# Patient Record
Sex: MALE | Race: WHITE | NOT HISPANIC OR LATINO | Employment: FULL TIME | URBAN - METROPOLITAN AREA
[De-identification: names, ages, dates, MRNs, and addresses within clinical notes are randomized per-mention and may not be internally consistent; named-entity substitution may affect disease eponyms.]

---

## 2023-11-20 ENCOUNTER — TELEMEDICINE CLINICAL SUPPORT (OUTPATIENT)
Dept: PREADMISSION TESTING | Facility: HOSPITAL | Age: 33
End: 2023-11-20
Payer: COMMERCIAL

## 2023-11-20 VITALS — HEIGHT: 70 IN | BODY MASS INDEX: 27.92 KG/M2 | WEIGHT: 195 LBS

## 2023-11-20 RX ORDER — OMEPRAZOLE 40 MG/1
40 CAPSULE, DELAYED RELEASE ORAL
COMMUNITY
Start: 2023-08-01

## 2023-11-20 NOTE — PREPROCEDURE INSTRUCTIONS
Current Medications   Medication Instructions    omeprazole (PriLOSEC) 40 mg DR capsule Continue until night before surgery                       NPO Instructions:    Do not eat any food after midnight the night before your surgery/procedure.    Additional Instructions:     Seven/Six Days before Surgery:  Review your medication instructions, stop indicated medications  Five Days before Surgery:  Review your medication instructions, stop indicated medications  Three Days before Surgery:  Review your medication instructions, stop indicated medications  The Day before Surgery:  Review your medication instructions, stop indicated medications  You will be contacted regarding the time of your arrival to facility and surgery time  Do not eat any food after Midnight  Day of Surgery:  Review your medication instructions, take indicated medications  Wear  comfortable loose fitting clothing  Do not use moisturizers, creams, lotions or perfume  All jewelry and valuables should be left at home    Reviewed pre-op instructions with patient, states understanding and denies further questions at this time.    If you have not received a call regarding your arrival time for surgery by 2pm on the day before surgery, you can call [phone].    Take Care!

## 2023-11-22 ENCOUNTER — APPOINTMENT (OUTPATIENT)
Dept: PREADMISSION TESTING | Facility: HOSPITAL | Age: 33
End: 2023-11-22
Payer: COMMERCIAL

## 2023-12-01 ENCOUNTER — ANESTHESIA EVENT (OUTPATIENT)
Dept: OPERATING ROOM | Facility: HOSPITAL | Age: 33
End: 2023-12-01
Payer: COMMERCIAL

## 2023-12-05 ENCOUNTER — APPOINTMENT (OUTPATIENT)
Dept: RADIOLOGY | Facility: HOSPITAL | Age: 33
End: 2023-12-05
Payer: COMMERCIAL

## 2023-12-05 ENCOUNTER — HOSPITAL ENCOUNTER (OUTPATIENT)
Facility: HOSPITAL | Age: 33
Setting detail: OUTPATIENT SURGERY
Discharge: HOME | End: 2023-12-05
Attending: ORTHOPAEDIC SURGERY | Admitting: ORTHOPAEDIC SURGERY
Payer: COMMERCIAL

## 2023-12-05 ENCOUNTER — ANESTHESIA (OUTPATIENT)
Dept: OPERATING ROOM | Facility: HOSPITAL | Age: 33
End: 2023-12-05
Payer: COMMERCIAL

## 2023-12-05 VITALS
RESPIRATION RATE: 16 BRPM | BODY MASS INDEX: 27.74 KG/M2 | HEIGHT: 70 IN | SYSTOLIC BLOOD PRESSURE: 126 MMHG | TEMPERATURE: 97.2 F | WEIGHT: 193.78 LBS | HEART RATE: 71 BPM | OXYGEN SATURATION: 95 % | DIASTOLIC BLOOD PRESSURE: 67 MMHG

## 2023-12-05 DIAGNOSIS — M25.852 FEMOROACETABULAR IMPINGEMENT OF LEFT HIP: Primary | ICD-10-CM

## 2023-12-05 PROBLEM — K21.9 GASTROESOPHAGEAL REFLUX DISEASE: Status: ACTIVE | Noted: 2023-12-05

## 2023-12-05 PROCEDURE — 3600000004 HC OR TIME - INITIAL BASE CHARGE - PROCEDURE LEVEL FOUR: Performed by: ORTHOPAEDIC SURGERY

## 2023-12-05 PROCEDURE — A29916 PR ARTHROSCOPY HIP W/LABRAL REPAIR: Performed by: NURSE ANESTHETIST, CERTIFIED REGISTERED

## 2023-12-05 PROCEDURE — 29914 HIP ARTHRO W/FEMOROPLASTY: CPT | Performed by: PHYSICIAN ASSISTANT

## 2023-12-05 PROCEDURE — 7100000009 HC PHASE TWO TIME - INITIAL BASE CHARGE: Performed by: ORTHOPAEDIC SURGERY

## 2023-12-05 PROCEDURE — 2500000004 HC RX 250 GENERAL PHARMACY W/ HCPCS (ALT 636 FOR OP/ED): Performed by: PHYSICIAN ASSISTANT

## 2023-12-05 PROCEDURE — 2500000004 HC RX 250 GENERAL PHARMACY W/ HCPCS (ALT 636 FOR OP/ED): Performed by: ORTHOPAEDIC SURGERY

## 2023-12-05 PROCEDURE — 29916 HIP ARTHRO W/LABRAL REPAIR: CPT | Performed by: ORTHOPAEDIC SURGERY

## 2023-12-05 PROCEDURE — 29999 UNLISTED PX ARTHROSCOPY: CPT | Performed by: PHYSICIAN ASSISTANT

## 2023-12-05 PROCEDURE — 2500000005 HC RX 250 GENERAL PHARMACY W/O HCPCS: Performed by: ORTHOPAEDIC SURGERY

## 2023-12-05 PROCEDURE — C1713 ANCHOR/SCREW BN/BN,TIS/BN: HCPCS | Performed by: ORTHOPAEDIC SURGERY

## 2023-12-05 PROCEDURE — 76000 FLUOROSCOPY <1 HR PHYS/QHP: CPT

## 2023-12-05 PROCEDURE — 7100000002 HC RECOVERY ROOM TIME - EACH INCREMENTAL 1 MINUTE: Performed by: ORTHOPAEDIC SURGERY

## 2023-12-05 PROCEDURE — 2500000001 HC RX 250 WO HCPCS SELF ADMINISTERED DRUGS (ALT 637 FOR MEDICARE OP): Performed by: PHYSICIAN ASSISTANT

## 2023-12-05 PROCEDURE — 76000 FLUOROSCOPY <1 HR PHYS/QHP: CPT | Mod: 59

## 2023-12-05 PROCEDURE — 2500000005 HC RX 250 GENERAL PHARMACY W/O HCPCS: Performed by: ANESTHESIOLOGIST ASSISTANT

## 2023-12-05 PROCEDURE — 2500000004 HC RX 250 GENERAL PHARMACY W/ HCPCS (ALT 636 FOR OP/ED): Performed by: STUDENT IN AN ORGANIZED HEALTH CARE EDUCATION/TRAINING PROGRAM

## 2023-12-05 PROCEDURE — 29914 HIP ARTHRO W/FEMOROPLASTY: CPT | Performed by: ORTHOPAEDIC SURGERY

## 2023-12-05 PROCEDURE — 7100000010 HC PHASE TWO TIME - EACH INCREMENTAL 1 MINUTE: Performed by: ORTHOPAEDIC SURGERY

## 2023-12-05 PROCEDURE — A29916 PR ARTHROSCOPY HIP W/LABRAL REPAIR: Performed by: STUDENT IN AN ORGANIZED HEALTH CARE EDUCATION/TRAINING PROGRAM

## 2023-12-05 PROCEDURE — 29916 HIP ARTHRO W/LABRAL REPAIR: CPT | Performed by: PHYSICIAN ASSISTANT

## 2023-12-05 PROCEDURE — 64450 NJX AA&/STRD OTHER PN/BRANCH: CPT | Performed by: STUDENT IN AN ORGANIZED HEALTH CARE EDUCATION/TRAINING PROGRAM

## 2023-12-05 PROCEDURE — 3700000001 HC GENERAL ANESTHESIA TIME - INITIAL BASE CHARGE: Performed by: ORTHOPAEDIC SURGERY

## 2023-12-05 PROCEDURE — 29861 HIP ARTHRO W/FB REMOVAL: CPT | Performed by: PHYSICIAN ASSISTANT

## 2023-12-05 PROCEDURE — 29999 UNLISTED PX ARTHROSCOPY: CPT | Performed by: ORTHOPAEDIC SURGERY

## 2023-12-05 PROCEDURE — 2500000004 HC RX 250 GENERAL PHARMACY W/ HCPCS (ALT 636 FOR OP/ED): Performed by: NURSE ANESTHETIST, CERTIFIED REGISTERED

## 2023-12-05 PROCEDURE — 2500000001 HC RX 250 WO HCPCS SELF ADMINISTERED DRUGS (ALT 637 FOR MEDICARE OP): Performed by: STUDENT IN AN ORGANIZED HEALTH CARE EDUCATION/TRAINING PROGRAM

## 2023-12-05 PROCEDURE — 2720000007 HC OR 272 NO HCPCS: Performed by: ORTHOPAEDIC SURGERY

## 2023-12-05 PROCEDURE — 3700000002 HC GENERAL ANESTHESIA TIME - EACH INCREMENTAL 1 MINUTE: Performed by: ORTHOPAEDIC SURGERY

## 2023-12-05 PROCEDURE — 3600000009 HC OR TIME - EACH INCREMENTAL 1 MINUTE - PROCEDURE LEVEL FOUR: Performed by: ORTHOPAEDIC SURGERY

## 2023-12-05 PROCEDURE — 2780000003 HC OR 278 NO HCPCS: Performed by: ORTHOPAEDIC SURGERY

## 2023-12-05 PROCEDURE — 29861 HIP ARTHRO W/FB REMOVAL: CPT | Performed by: ORTHOPAEDIC SURGERY

## 2023-12-05 PROCEDURE — 2500000004 HC RX 250 GENERAL PHARMACY W/ HCPCS (ALT 636 FOR OP/ED): Performed by: ANESTHESIOLOGIST ASSISTANT

## 2023-12-05 PROCEDURE — 7100000001 HC RECOVERY ROOM TIME - INITIAL BASE CHARGE: Performed by: ORTHOPAEDIC SURGERY

## 2023-12-05 PROCEDURE — 96372 THER/PROPH/DIAG INJ SC/IM: CPT | Performed by: ORTHOPAEDIC SURGERY

## 2023-12-05 DEVICE — NANOTACK TT SUTURE ANCHOR, 1.4MM WITH 1.2MM XBRAID TT
Type: IMPLANTABLE DEVICE | Site: HIP | Status: FUNCTIONAL
Brand: NANOTACK

## 2023-12-05 RX ORDER — KETOROLAC TROMETHAMINE 30 MG/ML
INJECTION, SOLUTION INTRAMUSCULAR; INTRAVENOUS AS NEEDED
Status: DISCONTINUED | OUTPATIENT
Start: 2023-12-05 | End: 2023-12-05

## 2023-12-05 RX ORDER — SODIUM CHLORIDE, SODIUM LACTATE, POTASSIUM CHLORIDE, CALCIUM CHLORIDE 600; 310; 30; 20 MG/100ML; MG/100ML; MG/100ML; MG/100ML
40 INJECTION, SOLUTION INTRAVENOUS CONTINUOUS
Status: DISCONTINUED | OUTPATIENT
Start: 2023-12-05 | End: 2023-12-05 | Stop reason: HOSPADM

## 2023-12-05 RX ORDER — FENTANYL CITRATE 50 UG/ML
50 INJECTION, SOLUTION INTRAMUSCULAR; INTRAVENOUS EVERY 5 MIN PRN
Status: DISCONTINUED | OUTPATIENT
Start: 2023-12-05 | End: 2023-12-05 | Stop reason: HOSPADM

## 2023-12-05 RX ORDER — MIDAZOLAM HYDROCHLORIDE 1 MG/ML
INJECTION, SOLUTION INTRAMUSCULAR; INTRAVENOUS AS NEEDED
Status: DISCONTINUED | OUTPATIENT
Start: 2023-12-05 | End: 2023-12-05

## 2023-12-05 RX ORDER — FENTANYL CITRATE 50 UG/ML
50 INJECTION, SOLUTION INTRAMUSCULAR; INTRAVENOUS ONCE
Status: DISCONTINUED | OUTPATIENT
Start: 2023-12-05 | End: 2023-12-05

## 2023-12-05 RX ORDER — CEFAZOLIN 1 G/1
INJECTION, POWDER, FOR SOLUTION INTRAVENOUS AS NEEDED
Status: DISCONTINUED | OUTPATIENT
Start: 2023-12-05 | End: 2023-12-05

## 2023-12-05 RX ORDER — BUPIVACAINE HYDROCHLORIDE 5 MG/ML
INJECTION, SOLUTION PERINEURAL AS NEEDED
Status: DISCONTINUED | OUTPATIENT
Start: 2023-12-05 | End: 2023-12-05 | Stop reason: HOSPADM

## 2023-12-05 RX ORDER — INDOMETHACIN 75 MG/1
75 CAPSULE, EXTENDED RELEASE ORAL DAILY
Qty: 10 CAPSULE | Refills: 0 | Status: SHIPPED | OUTPATIENT
Start: 2023-12-05 | End: 2023-12-15

## 2023-12-05 RX ORDER — PROPOFOL 10 MG/ML
INJECTION, EMULSION INTRAVENOUS AS NEEDED
Status: DISCONTINUED | OUTPATIENT
Start: 2023-12-05 | End: 2023-12-05

## 2023-12-05 RX ORDER — LABETALOL HYDROCHLORIDE 5 MG/ML
5 INJECTION, SOLUTION INTRAVENOUS EVERY 5 MIN PRN
Status: DISCONTINUED | OUTPATIENT
Start: 2023-12-05 | End: 2023-12-05 | Stop reason: HOSPADM

## 2023-12-05 RX ORDER — ALBUTEROL SULFATE 0.83 MG/ML
2.5 SOLUTION RESPIRATORY (INHALATION) EVERY 30 MIN PRN
Status: DISCONTINUED | OUTPATIENT
Start: 2023-12-05 | End: 2023-12-05 | Stop reason: HOSPADM

## 2023-12-05 RX ORDER — LIDOCAINE HYDROCHLORIDE 20 MG/ML
INJECTION, SOLUTION INFILTRATION; PERINEURAL AS NEEDED
Status: DISCONTINUED | OUTPATIENT
Start: 2023-12-05 | End: 2023-12-05

## 2023-12-05 RX ORDER — ACETAMINOPHEN 325 MG/1
975 TABLET ORAL ONCE
Status: COMPLETED | OUTPATIENT
Start: 2023-12-05 | End: 2023-12-05

## 2023-12-05 RX ORDER — OXYCODONE AND ACETAMINOPHEN 5; 325 MG/1; MG/1
1 TABLET ORAL EVERY 6 HOURS PRN
Status: COMPLETED | OUTPATIENT
Start: 2023-12-05 | End: 2023-12-05

## 2023-12-05 RX ORDER — ROCURONIUM BROMIDE 10 MG/ML
INJECTION, SOLUTION INTRAVENOUS AS NEEDED
Status: DISCONTINUED | OUTPATIENT
Start: 2023-12-05 | End: 2023-12-05

## 2023-12-05 RX ORDER — METHOCARBAMOL 750 MG/1
750 TABLET, FILM COATED ORAL 3 TIMES DAILY PRN
Qty: 30 TABLET | Refills: 0 | Status: SHIPPED | OUTPATIENT
Start: 2023-12-05

## 2023-12-05 RX ORDER — NEOSTIGMINE METHYLSULFATE 1 MG/ML
INJECTION, SOLUTION INTRAVENOUS AS NEEDED
Status: DISCONTINUED | OUTPATIENT
Start: 2023-12-05 | End: 2023-12-05

## 2023-12-05 RX ORDER — FENTANYL CITRATE 50 UG/ML
100 INJECTION, SOLUTION INTRAMUSCULAR; INTRAVENOUS ONCE
Status: CANCELLED | OUTPATIENT
Start: 2023-12-05 | End: 2023-12-05

## 2023-12-05 RX ORDER — OXYCODONE AND ACETAMINOPHEN 5; 325 MG/1; MG/1
1 TABLET ORAL EVERY 4 HOURS
Qty: 16 TABLET | Refills: 0 | Status: SHIPPED | OUTPATIENT
Start: 2023-12-05 | End: 2023-12-08

## 2023-12-05 RX ORDER — SODIUM CHLORIDE 9 MG/ML
100 INJECTION, SOLUTION INTRAVENOUS CONTINUOUS
Status: DISCONTINUED | OUTPATIENT
Start: 2023-12-05 | End: 2023-12-05 | Stop reason: HOSPADM

## 2023-12-05 RX ORDER — MIDAZOLAM HYDROCHLORIDE 1 MG/ML
2 INJECTION, SOLUTION INTRAMUSCULAR; INTRAVENOUS ONCE
Status: COMPLETED | OUTPATIENT
Start: 2023-12-05 | End: 2023-12-05

## 2023-12-05 RX ORDER — FENTANYL CITRATE 50 UG/ML
INJECTION, SOLUTION INTRAMUSCULAR; INTRAVENOUS AS NEEDED
Status: DISCONTINUED | OUTPATIENT
Start: 2023-12-05 | End: 2023-12-05

## 2023-12-05 RX ORDER — FENTANYL CITRATE 50 UG/ML
50 INJECTION, SOLUTION INTRAMUSCULAR; INTRAVENOUS ONCE
Status: COMPLETED | OUTPATIENT
Start: 2023-12-05 | End: 2023-12-05

## 2023-12-05 RX ORDER — CEFAZOLIN SODIUM 2 G/100ML
2 INJECTION, SOLUTION INTRAVENOUS ONCE
Status: DISCONTINUED | OUTPATIENT
Start: 2023-12-05 | End: 2023-12-05 | Stop reason: HOSPADM

## 2023-12-05 RX ORDER — ONDANSETRON HYDROCHLORIDE 2 MG/ML
INJECTION, SOLUTION INTRAVENOUS AS NEEDED
Status: DISCONTINUED | OUTPATIENT
Start: 2023-12-05 | End: 2023-12-05

## 2023-12-05 RX ORDER — ONDANSETRON 4 MG/1
4 TABLET, FILM COATED ORAL EVERY 8 HOURS PRN
Qty: 30 TABLET | Refills: 0 | Status: SHIPPED | OUTPATIENT
Start: 2023-12-05

## 2023-12-05 RX ORDER — ONDANSETRON HYDROCHLORIDE 2 MG/ML
4 INJECTION, SOLUTION INTRAVENOUS ONCE AS NEEDED
Status: DISCONTINUED | OUTPATIENT
Start: 2023-12-05 | End: 2023-12-05 | Stop reason: HOSPADM

## 2023-12-05 RX ORDER — MORPHINE SULFATE 0.5 MG/ML
INJECTION, SOLUTION EPIDURAL; INTRATHECAL; INTRAVENOUS AS NEEDED
Status: DISCONTINUED | OUTPATIENT
Start: 2023-12-05 | End: 2023-12-05 | Stop reason: HOSPADM

## 2023-12-05 RX ORDER — ASPIRIN 81 MG/1
81 TABLET ORAL 2 TIMES DAILY
Qty: 28 TABLET | Refills: 0 | Status: SHIPPED | OUTPATIENT
Start: 2023-12-05 | End: 2023-12-19

## 2023-12-05 RX ORDER — EPINEPHRINE 1 MG/ML
INJECTION INTRAMUSCULAR; INTRAVENOUS; SUBCUTANEOUS AS NEEDED
Status: DISCONTINUED | OUTPATIENT
Start: 2023-12-05 | End: 2023-12-05 | Stop reason: HOSPADM

## 2023-12-05 RX ORDER — GABAPENTIN 300 MG/1
600 CAPSULE ORAL ONCE
Status: COMPLETED | OUTPATIENT
Start: 2023-12-05 | End: 2023-12-05

## 2023-12-05 RX ORDER — MEPERIDINE HYDROCHLORIDE 25 MG/ML
12.5 INJECTION INTRAMUSCULAR; INTRAVENOUS; SUBCUTANEOUS EVERY 10 MIN PRN
Status: COMPLETED | OUTPATIENT
Start: 2023-12-05 | End: 2023-12-05

## 2023-12-05 RX ORDER — OXYCODONE HYDROCHLORIDE 5 MG/1
5 TABLET ORAL EVERY 4 HOURS PRN
Status: DISCONTINUED | OUTPATIENT
Start: 2023-12-05 | End: 2023-12-05 | Stop reason: HOSPADM

## 2023-12-05 RX ORDER — GLYCOPYRROLATE 0.2 MG/ML
INJECTION INTRAMUSCULAR; INTRAVENOUS AS NEEDED
Status: DISCONTINUED | OUTPATIENT
Start: 2023-12-05 | End: 2023-12-05

## 2023-12-05 RX ORDER — IPRATROPIUM BROMIDE 0.5 MG/2.5ML
500 SOLUTION RESPIRATORY (INHALATION) EVERY 30 MIN PRN
Status: DISCONTINUED | OUTPATIENT
Start: 2023-12-05 | End: 2023-12-05 | Stop reason: HOSPADM

## 2023-12-05 RX ADMIN — OXYCODONE HYDROCHLORIDE AND ACETAMINOPHEN 1 TABLET: 5; 325 TABLET ORAL at 19:03

## 2023-12-05 RX ADMIN — GLYCOPYRROLATE 1 MG: 0.2 INJECTION INTRAMUSCULAR; INTRAVENOUS at 17:39

## 2023-12-05 RX ADMIN — GABAPENTIN 600 MG: 300 CAPSULE ORAL at 13:37

## 2023-12-05 RX ADMIN — FENTANYL CITRATE 50 MCG: 50 INJECTION INTRAMUSCULAR; INTRAVENOUS at 14:05

## 2023-12-05 RX ADMIN — SODIUM CHLORIDE: 900 INJECTION, SOLUTION INTRAVENOUS at 17:07

## 2023-12-05 RX ADMIN — FENTANYL CITRATE 50 MCG: 50 INJECTION INTRAMUSCULAR; INTRAVENOUS at 15:50

## 2023-12-05 RX ADMIN — PROPOFOL 200 MG: 10 INJECTION, EMULSION INTRAVENOUS at 15:23

## 2023-12-05 RX ADMIN — KETOROLAC TROMETHAMINE 30 MG: 30 INJECTION, SOLUTION INTRAMUSCULAR; INTRAVENOUS at 17:28

## 2023-12-05 RX ADMIN — CEFAZOLIN 2 G: 330 INJECTION, POWDER, FOR SOLUTION INTRAMUSCULAR; INTRAVENOUS at 15:30

## 2023-12-05 RX ADMIN — ACETAMINOPHEN 975 MG: 325 TABLET ORAL at 13:37

## 2023-12-05 RX ADMIN — FENTANYL CITRATE 50 MCG: 50 INJECTION INTRAMUSCULAR; INTRAVENOUS at 14:06

## 2023-12-05 RX ADMIN — LIDOCAINE HYDROCHLORIDE 50 MG: 20 INJECTION, SOLUTION INFILTRATION; PERINEURAL at 15:23

## 2023-12-05 RX ADMIN — FENTANYL CITRATE 50 MCG: 50 INJECTION INTRAMUSCULAR; INTRAVENOUS at 15:23

## 2023-12-05 RX ADMIN — FENTANYL CITRATE 50 MCG: 50 INJECTION INTRAMUSCULAR; INTRAVENOUS at 17:50

## 2023-12-05 RX ADMIN — MEPERIDINE HYDROCHLORIDE 12.5 MG: 25 INJECTION INTRAMUSCULAR; INTRAVENOUS; SUBCUTANEOUS at 18:00

## 2023-12-05 RX ADMIN — MIDAZOLAM 2 MG: 1 INJECTION INTRAMUSCULAR; INTRAVENOUS at 15:24

## 2023-12-05 RX ADMIN — MEPERIDINE HYDROCHLORIDE 12.5 MG: 25 INJECTION INTRAMUSCULAR; INTRAVENOUS; SUBCUTANEOUS at 17:50

## 2023-12-05 RX ADMIN — ONDANSETRON 4 MG: 2 INJECTION INTRAMUSCULAR; INTRAVENOUS at 17:08

## 2023-12-05 RX ADMIN — MIDAZOLAM 2 MG: 1 INJECTION INTRAMUSCULAR; INTRAVENOUS at 14:07

## 2023-12-05 RX ADMIN — ROCURONIUM BROMIDE 50 MG: 10 INJECTION, SOLUTION INTRAVENOUS at 15:25

## 2023-12-05 RX ADMIN — HYDROMORPHONE HYDROCHLORIDE 0.4 MG: 1 INJECTION, SOLUTION INTRAMUSCULAR; INTRAVENOUS; SUBCUTANEOUS at 17:50

## 2023-12-05 RX ADMIN — NEOSTIGMINE METHYLSULFATE 5 MG: 1 INJECTION, SOLUTION INTRAVENOUS at 17:39

## 2023-12-05 RX ADMIN — SODIUM CHLORIDE 100 ML/HR: 900 INJECTION, SOLUTION INTRAVENOUS at 13:25

## 2023-12-05 SDOH — HEALTH STABILITY: MENTAL HEALTH: CURRENT SMOKER: 0

## 2023-12-05 ASSESSMENT — COLUMBIA-SUICIDE SEVERITY RATING SCALE - C-SSRS
1. IN THE PAST MONTH, HAVE YOU WISHED YOU WERE DEAD OR WISHED YOU COULD GO TO SLEEP AND NOT WAKE UP?: NO
2. HAVE YOU ACTUALLY HAD ANY THOUGHTS OF KILLING YOURSELF?: NO
6. HAVE YOU EVER DONE ANYTHING, STARTED TO DO ANYTHING, OR PREPARED TO DO ANYTHING TO END YOUR LIFE?: NO

## 2023-12-05 ASSESSMENT — PAIN - FUNCTIONAL ASSESSMENT
PAIN_FUNCTIONAL_ASSESSMENT: 0-10

## 2023-12-05 ASSESSMENT — PAIN SCALES - GENERAL
PAINLEVEL_OUTOF10: 7
PAINLEVEL_OUTOF10: 3
PAINLEVEL_OUTOF10: 3
PAINLEVEL_OUTOF10: 7
PAINLEVEL_OUTOF10: 3
PAINLEVEL_OUTOF10: 0 - NO PAIN
PAINLEVEL_OUTOF10: 3

## 2023-12-05 NOTE — OP NOTE
LEFT HIP SCOPE LABRAL REPAIR OSTEOPLASTY (L) Operative Note     Date: 2023  OR Location: [facility]    Name: Willard Tavarez : 1990, Age: 33 y.o., MRN: [id number], Sex: male    Diagnosis  PREOPERATIVE DIAGNOSIS:   1. 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   2. Acetabular labral tear.   3. Intra-articular loose bodies, one of which required separate   cannula for its removal.   4. Mild hip instability noted on exam under anesthesia.  POSTOPERATIVE DIAGNOSIS:   1. 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   2. Acetabular labral tear.   3. Intra-articular loose bodies, one of which required separate   cannula for its removal.   4. Mild hip instability noted on exam under anesthesia.      Procedures  OPERATION/PROCEDURE:   1. left hip arthroscopy with arthroscopic rim trim subspinous   decompression as a separate and identifiable procedure for pincer and   subspinous impingement.   2. Acetabular labral repair, 3-anchor looped configuration for a tear   extending from the 12 to 3 o'clock position.   3. Intra-articular loose body removal.   4. Femoral osteochondroplasty for CAM lesion.   5. Capsular plication.       Surgeons      * Luis Miguel Muñoz - Primary    Resident/Fellow/Other Assistant:  First assistant; Monae Norman PA-C.  Please note that we will be   billing for my physician's assistant as she was critical and   necessary for successful completion of this case including limb   positioning, anchor placement, suture management, and wound closure.    Procedure Summary  Anesthesia: Consult  ASA: II  Anesthesia Staff: Anesthesiologist: Anand Nava DO  CRNA: NELIA Denis-CRNA  C-AA: NADIA Redmond  Estimated Blood Loss: 5 mL  Intra-op Medications:   Medication Name Total Dose   sodium chloride 0.9% infusion 108.33 mL   acetaminophen (Tylenol) tablet 975 mg 975 mg   fentaNYL PF (Sublimaze) injection 50 mcg 50 mcg   fentaNYL PF (Sublimaze) injection 50 mcg 50 mcg    gabapentin (Neurontin) capsule 600 mg 600 mg   midazolam (Versed) injection 2 mg 2 mg              Anesthesia Record               Intraprocedure I/O Totals          Intake    sodium chloride 0.9% infusion 1000.00 mL    Total Intake 1000 mL          Specimen: No specimens collected     Staff:   Circulator: Cassie Donaldson RN; Anibal Walton RN  Scrub Person: Reyes Allen         Drains and/or Catheters: * None in log *    Tourniquet Times:         Implants:  Implants       Type Name Action Serial No.      Implant SUTURE ANCHOR, FG. NANOTECK TT, 1.4MM WITH 1.2MM XBRAID - [id number] Implanted      Implant SUTURE ANCHOR, FG. NANOTECK TT, 1.4MM WITH 1.2MM XBRAID - [id number] Implanted      Implant SUTURE ANCHOR, FG. NANOTECK TT, 1.4MM WITH 1.2MM XBRAID - [id number] Implanted                   Indications: Willard Tavarez is an 33 y.o. male who presented to my office with   persistent left-sided hip pain that had failed conservative   management.  Advanced imaging had revealed a labral tear in the   setting of mixed impingement.  Risks and benefits of surgery have   been discussed with the patient including, but not limited to,   bleeding, infection, damage to nerves or blood vessels, need for   further procedures, risks of anesthesia, blood clots, progression of   osteoarthritis, incomplete pain relief, heterotopic ossification,   pudendal nerve palsy, lateral femoral cutaneous nerve palsy,   avascular necrosis requiring hip replacement.  The patient understood   these risks and wished to proceed with the operative intervention.     The patient was seen in the preoperative area. The risks, benefits, complications, treatment options, non-operative alternatives, expected recovery and outcomes were discussed with the patient. The possibilities of reaction to medication, pulmonary aspiration, injury to surrounding structures, bleeding, recurrent infection, the need for additional procedures, failure to diagnose a condition, and  creating a complication requiring transfusion or operation were discussed with the patient. The patient concurred with the proposed plan, giving informed consent.  The site of surgery was properly noted/marked if necessary per policy. The patient has been actively warmed in preoperative area. Preoperative antibiotics have been ordered and given within 1 hours of incision. Venous thrombosis prophylaxis have been ordered including bilateral sequential compression devices    Procedure Details:     PREOPERATIVE DIAGNOSIS:   1. 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   2. Acetabular labral tear.   3. Intra-articular loose bodies, one of which required separate   cannula for its removal.   4. Mild hip instability noted on exam under anesthesia.       POSTOPERATIVE DIAGNOSIS:   1. 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   2. Acetabular labral tear.   3. Intra-articular loose bodies, one of which required separate   cannula for its removal.   4. Mild hip instability noted on exam under anesthesia.       OPERATION/PROCEDURE:   1. left hip arthroscopy with arthroscopic rim trim subspinous   decompression as a separate and identifiable procedure for pincer and   subspinous impingement.   2. Acetabular labral repair, 3-anchor looped configuration for a tear   extending from the 12 to 3 o'clock position.   3. Intra-articular loose body removal.   4. Femoral osteochondroplasty for CAM lesion.   5. Capsular plication.       SURGEON:   Luis Miguel Muñoz MD.       ASSISTANT(S):   First assistant; Monae Norman PA-C.  Please note that we will be   billing for my physician's assistant as she was critical and   necessary for successful completion of this case including limb   positioning, anchor placement, suture management, and wound closure.       TRACTION TIME:   Less than 1 hour.       INDICATION FOR PROCEDURE:   Pleasant patient presented to my office with   persistent left-sided hip pain that had  failed conservative   management.  Advanced imaging had revealed a labral tear in the   setting of mixed impingement.  Risks and benefits of surgery have   been discussed with the patient including, but not limited to,   bleeding, infection, damage to nerves or blood vessels, need for   further procedures, risks of anesthesia, blood clots, progression of   osteoarthritis, incomplete pain relief, heterotopic ossification,   pudendal nerve palsy, lateral femoral cutaneous nerve palsy,   avascular necrosis requiring hip replacement.  The patient understood   these risks and wished to proceed with the operative intervention.       PROCEDURE AND FINDINGS:   The patient was identified in the preoperative holding area.   Operative extremity was marked with an indelible marker.  Informed   consent was reviewed.  Patient was taken to the operating room where a   time-out was performed verifying correct site, side, procedure, and   our special equipment.   They were placed supine on the operating room   table.  All bony prominences were well padded.  Patient was then prepped   and draped in usual sterile fashion after anesthesia induced was   without difficulty.  Once the patient was prepped and draped, the hip   was distracted via postless technique.  Standard anterolateral   arthroscopy portal was created.  A modified mid anterior portal   created.  A capsulotomy was performed.  Combination of a shaver and   radiofrequency device used to clean off the superior acetabular rim   identifying the patient's pincer and subspinous impingement, this was   taken down with a bur in the standard fashion as a separate and   identifiable procedure.  We then placed 3 anchors; 1 at 12, 1 at 1, 1   at 3 o'clock; these were shuttled around the labrum, tied down with   alternating half hitches in a loop configuration.  He was noted to have some chondral delamination from the 12-3 o'clock position about 1/3 down the face of the acetabulum.   We noted excellent  fixation of labrum.  A few intra-articular loose bodies were removed   arthroscopically.  Head was reduced.  We noted excellent resolution   of the patient's suction seal.  We identified and protected the   lateral retinacular vessels throughout the remainder of the case.  An   osteoplasty was then performed from the medial to lateral synovial   fold and proximally and distally to the extent of lesion.  Dynamic   examination revealed no residual impingement.  An x-ray showed good   sphericity on multiple views.  We then performed a capsular plication   of the interportal capsulotomy with multiple sutures, tying these   down with alternating half hitches and clipping them with the knot.   We drained the hip, withdrew the arthroscope, closed the portals with   interrupted sutures, applied a sterile bandage.  The patient was   awoken from anesthesia without complication, transported to PACU in   stable condition.  Postoperative course will be standard hip   arthroscopy protocol.           Luis Miguel Muñoz MD       Complications:  None; patient tolerated the procedure well.    Disposition: PACU - hemodynamically stable.  Condition: stable         Attending Attestation: I was present and scrubbed for the entire procedure.    Luis Miguel Muñoz  Phone Number: [phone]

## 2023-12-05 NOTE — PERIOPERATIVE NURSING NOTE
Pt doing well in pacu. Pt reports pain as tolerable. Pt spoke to Dr. Muñoz at bedside. Pt with no ponv. Tolerates po well. Pt ready for phase 2.

## 2023-12-05 NOTE — ANESTHESIA PROCEDURE NOTES
Peripheral Block    Patient location during procedure: pre-op  Start time: 12/5/2023 2:11 PM  End time: 12/5/2023 2:12 PM  Reason for block: at surgeon's request and post-op pain management  Staffing  Performed: attending   Authorized by: Anand Nava DO    Performed by: Anand Nava DO  Preanesthetic Checklist  Completed: patient identified, IV checked, site marked, risks and benefits discussed, surgical consent, monitors and equipment checked, pre-op evaluation and timeout performed   Timeout performed at: 12/5/2023 2:04 PM  Peripheral Block  Patient position: laying flat  Prep: ChloraPrep  Patient monitoring: heart rate, cardiac monitor and continuous pulse ox  Block type: other  Laterality: left  Injection technique: single-shot  Guidance: ultrasound guided  Infiltration strength: 0.5 %  Dose: 20 mL  Needle  Needle type: PAJUNK.   Needle gauge: 22 G  Needle length: 10 cm  Needle localization: ultrasound guidance  Assessment  Injection assessment: negative aspiration for heme, no paresthesia on injection and incremental injection  Paresthesia pain: none  Heart rate change: no  Slow fractionated injection: yes  Additional Notes  5mg decadron

## 2023-12-05 NOTE — ANESTHESIA PREPROCEDURE EVALUATION
Patient: Willard Tavarez    Procedure Information       Date/Time: 12/05/23 1245    Procedure: LEFT HIP SCOPE LABRAL REPAIR OSTEOPLASTY (Left: Hip)    Location: [facility] / [facility]    Surgeons: Luis Miguel Muñoz MD            Relevant Problems   Anesthesia (within normal limits)      GI   (+) Gastroesophageal reflux disease       Clinical information reviewed:   Tobacco  Allergies  Meds   Med Hx  Surg Hx   Fam Hx  Soc Hx        NPO Detail:  NPO/Void Status  Date of Last Liquid: 12/04/23  Time of Last Liquid: 2300 (pt states drinking water up until noon 12/5/23)  Date of Last Solid: 12/04/23  Time of Last Solid: 2100  Time of Last Void: 1200         Physical Exam    Airway  Mallampati: II  TM distance: >3 FB  Neck ROM: full     Cardiovascular   Comments: Deferred   Dental   Comments: No loose teeth   Pulmonary   Comments: Deferred   Abdominal     Comments: Deferred           Anesthesia Plan    ASA 2     general and regional     The patient is not a current smoker.  Patient was not previously instructed to abstain from smoking on day of procedure.  Patient did not smoke on day of procedure.  Education provided regarding risk of obstructive sleep apnea.  intravenous induction   Postoperative administration of opioids is intended.  Anesthetic plan and risks discussed with patient.  Use of blood products discussed with patient who consented to blood products.    Plan discussed with CRNA and CAA.

## 2023-12-05 NOTE — BRIEF OP NOTE
Date: 2023  OR Location: [facility]    Name: Willard Tavarez : 1990, Age: 33 y.o., MRN: [id number], Sex: male    Diagnosis  Pre-op Diagnosis:  Left femoroacetabular impingement  Left hip labral tear Post-op Diagnosis:  Left femoroacetabular impingement  Left hip labral tear     Procedures  Left hip arthroscopic labral repair  Left hip arthroscopic cartilaginous loose body removal  Left hip arthroscopic capsular plication  Left hip arthroscopic femoral osteoplasty    Surgeons      * Luis Miguel Muñoz - Primary    Resident/Fellow/Other Assistant:  Surgeon(s) and Role:   RACHNA Bullard MD    Procedure Summary  Anesthesia: Consult  ASA: II  Anesthesia Staff: Anesthesiologist: Anand Nava DO  CRNA: NELIA Denis-CRNA  C-AA: NADIA Redmond  Estimated Blood Loss: 2 mL  Intra-op Medications:   Medication Name Total Dose   sodium chloride 0.9% infusion 108.33 mL   acetaminophen (Tylenol) tablet 975 mg 975 mg   fentaNYL PF (Sublimaze) injection 50 mcg 50 mcg   fentaNYL PF (Sublimaze) injection 50 mcg 50 mcg   gabapentin (Neurontin) capsule 600 mg 600 mg   midazolam (Versed) injection 2 mg 2 mg            Anesthesia Record               Intraprocedure I/O Totals          Intake    sodium chloride 0.9% infusion 1000.00 mL    Total Intake 1000 mL          Specimen: Cartilaginous loose body    Staff:   Circulator: Cassie Donaldson RN; Anibal Walton RN  Scrub Person: Reyes Allen    Findings: See full operative report    Complications:  None; patient tolerated the procedure well.     Disposition: PACU - hemodynamically stable.  Condition: stable  Specimens Collected: No specimens collected

## 2023-12-05 NOTE — H&P
This note was copied and updated from the original note by Luis Miguel Muñoz MD, Orthopaedic Surgery Sports Medicine, on 9/27/2023.    To OR on 12/5/2023 with Luis Miguel Muñoz MD, Orthopaedic Surgery Sports Medicine, for left arthroscopic labral repair, osteoplasty, capsular plication, and possible loose body removal.    Orthopaedic Surgery Note 9/27/23    Diagnoses/Problems  Assessed    · Acetabular labrum tear, left, initial encounter (843.8) (C53.597B)     Patient Discussion/Summary     33-year-old male otherwise healthy with femoral acetabular impingement. He is a candidate for left hip arthroscopy he is very symptomatic on the left side. MRI of the left hip from [facility] on 9/21/23 were reviewed and revealed cam deformity, paralabral cyst with labral tearing, and evidence of early chondral damage. He would like to proceed with left hip arthroscopy, and prefers to have surgery done anytime after mid/late December as he is currently in busy season for work.      Patient continues to have debilitating pain. They have had physical therapy and maintained a home exercise program over the past 3 months. Severe deep groin pain increased with sitting/hip flexion. On exam there is a positive impingement sign and limited hip flexion and rotation. Xrays confirm femoral acetabular impingement with an alpha angle of 70. MRI confirms tearing of the anterior labrum. Due to fact they have continued severe pain unresponsive to conservative management and radiographic findings consistent with femoral acetabular impingement and labral tearing, we will proceed with a hip arthroscopy.     Surgery would consist of a diagnostic arthroscopy labral repair rim trimming and subspinous decompression for pincer and subspinous impingement secondary to his crossover and focal acetabular retroversion, loose body removal for possible cartilaginous loose body seen on his MRI scan femoral osteochondroplasty and capsular plication     Risks of surgery  which include but are not limited to bleeding, infection, damage to nerves, damage to blood vessels, blood clots, heterotopic ossification, avascular necrosis, progression to hip replacement, pudendal nerve palsy, iatrogenic instability, incomplete pain relief, capsular labral adhesions, decreased range of motion, risks of anesthesia including heart attack stroke and death were explained to the patient. They expressed understanding of these risks and wished to proceed with operative intervention.     Patient was prescribed a hinged hip brace for femoral acetabular impingement and a labral tear.The patient is ambulatory with or without aid; but, has weakness, instability and/or deformity of their left hip which requires stabilization from this orthosis to improve their function.      Verbal and written instructions for the use, wear schedule, cleaning and application of this item were given. Patient was instructed that should the brace result in increased pain, decreased sensation, increased swelling, or an overall worsening of their medical condition, to please contact our office immediately.      Orthotic management and training was provided for skin care, modifications due to healing tissues, edema changes, interruption in skin integrity, and safety precautions with the orthosis.           Chief Complaint     Patient here for f/u of Left Hip. MRI Review. -mv-      History of Present Illness  Willard is a very pleasant 33-year-old male presenting for left hip pain. He has history of known bilateral cam deformities and JEFF. Discussed arthroscopy in 2020, however patient was unable to proceed with surgery at that time. 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 States he would like to proceed with left hip arthroscopy at this time as pain has become more severe at the end of the day and while sleeping. Denies any new injury,  swelling, redness, warmth, numbness/tingling/weakness. Had MRI done through [facility] [place] on 9/21/2023 which revealed left cam deformity, early chondral thinning and labral tearing with paralabral cyst.      Review of Systems  10-point ROS completed and negative other than symptoms listed in the HPI.      Active Problems  Problems    · Acetabular labrum tear, left, initial encounter (843.8) (S73.192A)   · Elbow pain, left (719.42) (M25.522)   · Femoral acetabular impingement (719.85) (M25.859)   · Lateral epicondylitis of left elbow (726.32) (M77.12)   · Left elbow pain (719.42) (M25.522)   · Left hip pain (719.45) (M25.552)     Social History  Problems    · Moderate alcohol use   · Never a smoker   · No illicit drug use     Allergies  Medication    · No Known Drug Allergies   Recorded By: Nasra Connors; 10/26/2020 9:20:50 AM     Physical Exam  The patient has intact sensation to light touch in the distribution of all lower extremity dermatomes without deficit.     Patient's range of motion reveals that they have 90Â° of hip  flexion on the affected side.  Internal rotation 5 degrees, external rotation to 45 degrees  Hip extension to 10Â°  Abduction to 45Â°     The patient is 5 out of 5 strength with resisted hip AB, adduction,  hamstring and quadriceps testing.     No pain over the hip flexor, ASIS.  No pain over the proximal hamstring, piriformis     Pain Provocation testing:  Positive impingement sign, with a painful arc from 12 to 3:00.  negative Psoas impingement/Yong test  negative instability, Log roll.  negative straight leg raise.  negative circumduction clunk.     Peritrochanteric space examination:  negative tenderness over the anterior and lateral trochanter.  No pain over the posterior trochanter.  No snapping with bicycle maneuver      Results/Data  MRI of the left hip from outside source on 9/21/23 were reviewed and revealed cam deformity, paralabral cyst with labral tearing, and evidence of early  chondral changes  The studies were reviewed with Dr. Muñoz personally in the office today.      Attending Note        Trainee role: Fellow    Trainee discussed patient with Dr. Muñoz

## 2023-12-05 NOTE — ANESTHESIA POSTPROCEDURE EVALUATION
Patient: Willard Tavarez    Procedure Summary       Date: 12/05/23 Room / Location: [facility] / [facility]    Anesthesia Start: 1519 Anesthesia Stop: 1756    Procedure: LEFT HIP SCOPE LABRAL REPAIR OSTEOPLASTY (Left: Hip) Diagnosis:     Surgeons: Luis Miguel Muñoz MD Responsible Provider: Anand Nava DO    Anesthesia Type: general, regional ASA Status: 2            Anesthesia Type: general, regional    Vitals Value Taken Time   /86 12/05/23 1746   Temp 36.2 °C (97.2 °F) 12/05/23 1746   Pulse 87 12/05/23 1746   Resp 14 12/05/23 1746   SpO2 100 12/05/23 1805       Anesthesia Post Evaluation    Patient location during evaluation: bedside  Patient participation: complete - patient participated  Level of consciousness: awake and alert  Pain management: adequate  Multimodal analgesia pain management approach  Airway patency: patent  Two or more strategies used to mitigate risk of obstructive sleep apnea  Cardiovascular status: acceptable  Respiratory status: acceptable  Hydration status: acceptable  Postoperative Nausea and Vomiting: none        No notable events documented.

## 2023-12-05 NOTE — ANESTHESIA PROCEDURE NOTES
Airway  Date/Time: 12/5/2023 3:23 PM  Urgency: elective    Airway not difficult    Staffing  Performed: NADIA   Authorized by: Anand Nava DO    Performed by: NADIA Redmond  Patient location during procedure: OR    Indications and Patient Condition  Indications for airway management: anesthesia  Spontaneous Ventilation: absent      Final Airway Details  Final airway type: endotracheal airway      Successful airway: ETT  Cuffed: yes   Successful intubation technique: direct laryngoscopy  Blade: Shaquille  Blade size: #3  ETT size (mm): 7.5  Cormack-Lehane Classification: grade I - full view of glottis  Measured from: lips  ETT to lips (cm): 23  Number of attempts at approach: 1

## 2023-12-05 NOTE — DISCHARGE INSTRUCTIONS
Hip Arthroscopy Postoperative Instructions       Diet  Begin with clear liquids and light foods (jello, soup, etc)  Progress to your normal diet if you are not nauseated    Wound Care  Maintain your operative dressing, loosen bandage if swelling occurs  It is normal to have some bleeding or oozing from the surgical sites due to fluid irrigation during the surgery.  Please change the dressing as needed.  Removal surgical dressing on the third post-operative day. If minimal drainage is present, apply bandaids over incisions and change daily.  Do not apply bacitracin or other ointments to the incisions.  You can remove VAN hose (long white socks) on the third post operative day  To avoid infection, keep incisions clean and dry.  You can shower 2 days post op.  MUST KEEP THE INCISIONS DRY.  NO immersion of the leg into a bath/pool etc.    Ice Therapy  Begin immediately after surgery  Use ice machine continuously or ice packs every 2 hours for 20 minutes daily.  Do not place the wrap or ice packs directly onto skin.     Activity  Elevate the operative leg to chest level whenever possible to decrease swelling  Do not place pillows under the knee, but rather place a pillow under the foot/ankle if needed  Use Crutches for ambulation.  Weight bearing will be determined by the procedure  Avoid long periods of sitting without the leg elevated or long distance travel for 2 weeks  No driving until discussed at your first post-operative appointment  May return to sedentary work or school once you have discontinued narcotic pain medication    Brace  Your brace should be worn at all times but can be removed for hygiene and physical therapy     Exercise  Do ankle pumps continuously throughout the day to reduce the possibility of a blood clot in your calf  Formal physical therapy will begin post-operative day #1      Medications  Pain medication is injected in the wound and hip during surgery.  This will wear off within 8-12 hours.    You may have received a nerve block prior to surgery. If so, this will wear off within 24-36 hours.  Most patients require narcotic pain medication for a short period of time after surgery.  Common side effects include nausea, drowsiness and constipation.  To decrease side effects take these medications with food. If constipation occurs, you can utilize over the counter Colace or Miralax.  If you are having problems with nausea and vomiting, contact the office to discuss.  Do not drive a car or operate machinery while taking narcotic pain medication.  The following medications are enclosed to use post-operatively  Percocet (Oxycodone with Acetaminophen) for pain control  Indocin (Indomethacin) for heterotopic bone prophylaxis  Baby aspirin twice a day to help reduce the risk of a blood clot  Zofran (Ondansetron) as needed for nausea  Robaxin (Methocarbamol) as needed for spasms should they occur  Naproxen to take as needed for pain after you complete the Indocin    Contact information  Our office phone is [phone].  Contact the office with any of the following  Painful swelling or numbness  Unrelenting pain  Fever over 101° or chills  Redness around incision  Continuous drainage or bleeding from the incision. A small amount is to be expected)  Color change in the leg  Trouble breathing  Excessive nausea or vomiting  If you have an emergency after hours on the weekend, please call [phone] to reach the answering service who will contact Dr. Muñoz  If you have an emergency that requires immediate attention, proceed to the nearest emergency room or call 101.    Follow up  Your first post operative appointment should be scheduled around 14 days after surgery. Contact the office at [phone] if this has not yet been set up.

## 2023-12-05 NOTE — LETTER
November 15, 2023     Willard Tavarez    Patient: Willard Tavarez   YOB: 1990   Date of Visit: 9/29/2023       To Whom It May Concern:      Willard Tavarez is under our care for his left hip.  He is scheduled for surgery on 12/5/23.  We recommend he be allowed to go outside of the [facility] for his physical therapy.  This is a unique rehab and needs to be completed with a PT experienced with this type of procedure.  Contact the office with any questions.             Monae Norman PA-C

## 2023-12-18 ENCOUNTER — OFFICE VISIT (OUTPATIENT)
Dept: ORTHOPEDIC SURGERY | Facility: HOSPITAL | Age: 33
End: 2023-12-18
Payer: COMMERCIAL

## 2023-12-18 DIAGNOSIS — S73.192D TEAR OF LEFT ACETABULAR LABRUM, SUBSEQUENT ENCOUNTER: Primary | ICD-10-CM

## 2023-12-18 PROCEDURE — 99024 POSTOP FOLLOW-UP VISIT: CPT | Performed by: PHYSICIAN ASSISTANT

## 2023-12-18 PROCEDURE — 1036F TOBACCO NON-USER: CPT | Performed by: PHYSICIAN ASSISTANT

## 2023-12-18 NOTE — PROGRESS NOTES
Patient presents for his post-op visit for left hip scope DOS-12/5/23    HIP POST OP  The patient returns status post Left hip arthroscopy on 12/5/23.  They are doing well.  They have no evidence of lower extremity DVT.  Incisions are clean and dry.  Healing well.  Their pain is appropriate.  Range of motion is within normal limits.    Continue therapy per protocol at [facility].  I will plan to see them back 6 weeks from surgery.        DENVER SosaC

## 2023-12-29 DIAGNOSIS — S73.192D TEAR OF LEFT ACETABULAR LABRUM, SUBSEQUENT ENCOUNTER: Primary | ICD-10-CM

## 2023-12-29 RX ORDER — SULFAMETHOXAZOLE AND TRIMETHOPRIM 800; 160 MG/1; MG/1
1 TABLET ORAL 2 TIMES DAILY
Qty: 28 TABLET | Refills: 0 | Status: SHIPPED | OUTPATIENT
Start: 2023-12-29

## 2024-01-19 ENCOUNTER — APPOINTMENT (OUTPATIENT)
Dept: ORTHOPEDIC SURGERY | Facility: HOSPITAL | Age: 34
End: 2024-01-19
Payer: COMMERCIAL

## 2024-01-29 ENCOUNTER — OFFICE VISIT (OUTPATIENT)
Dept: ORTHOPEDIC SURGERY | Facility: HOSPITAL | Age: 34
End: 2024-01-29
Payer: COMMERCIAL

## 2024-01-29 DIAGNOSIS — S73.192D TEAR OF LEFT ACETABULAR LABRUM, SUBSEQUENT ENCOUNTER: Primary | ICD-10-CM

## 2024-01-29 PROCEDURE — 1036F TOBACCO NON-USER: CPT | Performed by: PHYSICIAN ASSISTANT

## 2024-01-29 PROCEDURE — 99024 POSTOP FOLLOW-UP VISIT: CPT | Performed by: PHYSICIAN ASSISTANT

## 2024-01-29 ASSESSMENT — PAIN - FUNCTIONAL ASSESSMENT: PAIN_FUNCTIONAL_ASSESSMENT: NO/DENIES PAIN

## 2024-01-31 NOTE — PROGRESS NOTES
Patient is here today little over 6 weeks out from his left hip arthroscopy.  Date of surgery 12/5/2023.  Overall he is doing pretty well.  His range of motion is progressing nicely.  He notes significant improvement in his preoperative pain.  He is noticing pain on the right side and he will continue therapeutic exercises on that side as well.  He is doing physical therapy over at [facility] due to insurance constraints.  I will see him back in 6 weeks.      Monae Norman PA-C

## 2024-03-15 ENCOUNTER — APPOINTMENT (OUTPATIENT)
Dept: ORTHOPEDIC SURGERY | Facility: HOSPITAL | Age: 34
End: 2024-03-15
Payer: COMMERCIAL

## (undated) DEVICE — DRAPE, INSTRUMENT, W/POUCH, STERI DRAPE, 9 5/8 X 18 LONG

## (undated) DEVICE — CANNULA, FLOWPORT II, WITH OBTURATOR

## (undated) DEVICE — BLADE, GREAT WHITE CONCAVE, 4.2MM, PREBENT

## (undated) DEVICE — GLOVE, SURGICAL, PROTEXIS PI W/NEU-THERA, 8.5, PF, LF

## (undated) DEVICE — Device

## (undated) DEVICE — ARTHROWAND, MULTIVAC 50XL, ICW

## (undated) DEVICE — ENTRY KIT, PORTAL

## (undated) DEVICE — SLINGSHOT, 45 DEG UP

## (undated) DEVICE — SYRINGE, 60 CC, IRRIGATION, BULB, CONTRO-BULB, PAPER POUCH

## (undated) DEVICE — TUBING, OUTFLOW, DRAIN PIPE, W/ONE WAY VALVE (FMS VUE)

## (undated) DEVICE — CANNULA, 7 X 110

## (undated) DEVICE — CATHETER TRAY, FOLEY, 18FR, 10ML, W/ DRAINBAG

## (undated) DEVICE — BUR, SPHERICAL, 4.5MM, PREBENT

## (undated) DEVICE — SUTURE TAPE, XBRAID, 1.4MM BLACK/WHITE

## (undated) DEVICE — CONTAINER, SPECIMEN, 4 OZ, OR PEEL PACK, STERILE

## (undated) DEVICE — SUTURE MANAGER, NANOPASSER, CRESCENT

## (undated) DEVICE — GLOVE, SURGICAL, PROTEXIS PI MICRO, 6.5, PF, LF

## (undated) DEVICE — BUR, SPHERICAL, 5.5MM, PREBENT

## (undated) DEVICE — GOWN, SURGICAL, SMARTGOWN, XLARGE, STERILE

## (undated) DEVICE — SYRINGE, 10 CC, LUER LOCK

## (undated) DEVICE — SUTURE, ETHILON, 3-0, 18 IN, PS1, BLACK

## (undated) DEVICE — TUBING, SUCTION, 9 FT, STERILE, CLEAR

## (undated) DEVICE — DRILL, ICONIX, 1.4MM, DISPOSABLE

## (undated) DEVICE — GLOVE, SURGICAL, PROTEXIS PI BLUE W/NEUTHERA, 7.0, PF, LF

## (undated) DEVICE — GLOVE, SURGICAL, PROTEXIS PI MICRO, 8.0, PF, LF

## (undated) DEVICE — TUBING, NFLOW, FMS VUE, SNGL USE, DISP, LF